# Patient Record
Sex: FEMALE | Race: WHITE
[De-identification: names, ages, dates, MRNs, and addresses within clinical notes are randomized per-mention and may not be internally consistent; named-entity substitution may affect disease eponyms.]

---

## 2018-11-16 ENCOUNTER — HOSPITAL ENCOUNTER (OUTPATIENT)
Dept: HOSPITAL 39 - LAB.O | Age: 53
End: 2018-11-16
Attending: NURSE PRACTITIONER
Payer: COMMERCIAL

## 2018-11-16 DIAGNOSIS — N39.0: ICD-10-CM

## 2018-11-16 DIAGNOSIS — N20.1: Primary | ICD-10-CM

## 2018-11-16 NOTE — RAD
EXAM DESCRIPTION:  Abdomen 1 View



CLINICAL HISTORY:  53 years  Female  KIDNEY STONE



COMPARISON:  None.



TECHNIQUE:  Single view of the abdomen.



FINDINGS:



The upper abdomen was not entirely included on the film. No

dilated loops of bowel to suggest obstruction.   Moderately large

amount of fecal material in the colon. Psoas margins are

well-defined.



IMPRESSION:



No definite calculus is noted. Consider further evaluation with

CT stone protocol



Electronically signed by:  Elaine Pereira MD  11/16/2018 5:14 PM

[facility]ST Workstation: [id number]